# Patient Record
Sex: MALE | Race: WHITE | NOT HISPANIC OR LATINO | Employment: UNEMPLOYED | URBAN - NONMETROPOLITAN AREA
[De-identification: names, ages, dates, MRNs, and addresses within clinical notes are randomized per-mention and may not be internally consistent; named-entity substitution may affect disease eponyms.]

---

## 2018-11-01 ENCOUNTER — OFFICE VISIT (OUTPATIENT)
Dept: CARDIOLOGY | Facility: CLINIC | Age: 78
End: 2018-11-01

## 2018-11-01 VITALS
HEIGHT: 71 IN | HEART RATE: 65 BPM | DIASTOLIC BLOOD PRESSURE: 60 MMHG | SYSTOLIC BLOOD PRESSURE: 110 MMHG | BODY MASS INDEX: 30.52 KG/M2 | WEIGHT: 218 LBS

## 2018-11-01 DIAGNOSIS — Z72.0 TOBACCO ABUSE: ICD-10-CM

## 2018-11-01 DIAGNOSIS — I82.401 DEEP VEIN THROMBOSIS (DVT) OF RIGHT LOWER EXTREMITY, UNSPECIFIED CHRONICITY, UNSPECIFIED VEIN (HCC): ICD-10-CM

## 2018-11-01 DIAGNOSIS — E11.9 TYPE 2 DIABETES MELLITUS WITHOUT COMPLICATION, WITH LONG-TERM CURRENT USE OF INSULIN (HCC): ICD-10-CM

## 2018-11-01 DIAGNOSIS — E88.81 METABOLIC SYNDROME: ICD-10-CM

## 2018-11-01 DIAGNOSIS — I25.9 IHD (ISCHEMIC HEART DISEASE): Primary | ICD-10-CM

## 2018-11-01 DIAGNOSIS — E78.00 HYPERCHOLESTEREMIA: ICD-10-CM

## 2018-11-01 DIAGNOSIS — I73.9 PAD (PERIPHERAL ARTERY DISEASE) (HCC): ICD-10-CM

## 2018-11-01 DIAGNOSIS — I25.6 SILENT MYOCARDIAL ISCHEMIA: ICD-10-CM

## 2018-11-01 DIAGNOSIS — R01.1 MURMUR, CARDIAC: ICD-10-CM

## 2018-11-01 DIAGNOSIS — Z79.4 TYPE 2 DIABETES MELLITUS WITHOUT COMPLICATION, WITH LONG-TERM CURRENT USE OF INSULIN (HCC): ICD-10-CM

## 2018-11-01 DIAGNOSIS — I10 ESSENTIAL HYPERTENSION: ICD-10-CM

## 2018-11-01 DIAGNOSIS — R31.0 GROSS HEMATURIA: ICD-10-CM

## 2018-11-01 PROCEDURE — 93000 ELECTROCARDIOGRAM COMPLETE: CPT | Performed by: INTERNAL MEDICINE

## 2018-11-01 PROCEDURE — 99204 OFFICE O/P NEW MOD 45 MIN: CPT | Performed by: INTERNAL MEDICINE

## 2018-11-01 RX ORDER — CITALOPRAM 20 MG/1
20 TABLET ORAL DAILY
COMMUNITY

## 2018-11-01 RX ORDER — SIMVASTATIN 40 MG
40 TABLET ORAL NIGHTLY
COMMUNITY

## 2018-11-01 RX ORDER — NITROGLYCERIN 0.4 MG/1
0.4 TABLET SUBLINGUAL
COMMUNITY

## 2018-11-01 RX ORDER — GABAPENTIN 300 MG/1
300 CAPSULE ORAL 2 TIMES DAILY
COMMUNITY

## 2018-11-01 RX ORDER — CLOPIDOGREL BISULFATE 75 MG/1
75 TABLET ORAL DAILY
COMMUNITY

## 2018-11-01 RX ORDER — WARFARIN SODIUM 6 MG/1
6 TABLET ORAL
COMMUNITY

## 2018-11-01 RX ORDER — METOPROLOL TARTRATE 50 MG/1
TABLET, FILM COATED ORAL
COMMUNITY

## 2018-11-01 RX ORDER — HYDROCODONE BITARTRATE AND ACETAMINOPHEN 7.5; 325 MG/1; MG/1
1 TABLET ORAL 2 TIMES DAILY PRN
COMMUNITY

## 2018-11-01 RX ORDER — ASPIRIN 81 MG/1
81 TABLET ORAL DAILY
COMMUNITY

## 2018-11-01 RX ORDER — TRAMADOL HYDROCHLORIDE 50 MG/1
50 TABLET ORAL 2 TIMES DAILY PRN
COMMUNITY

## 2018-11-01 RX ORDER — BENAZEPRIL HYDROCHLORIDE 40 MG/1
40 TABLET, FILM COATED ORAL DAILY
COMMUNITY

## 2018-11-01 RX ORDER — ISOSORBIDE MONONITRATE 60 MG/1
60 TABLET, EXTENDED RELEASE ORAL DAILY
COMMUNITY

## 2018-11-01 NOTE — PROGRESS NOTES
I advised Alejandro of the risks of continuing to use tobacco, and I provided him with tobacco cessation educational materials in the After Visit Summary.     During this visit, I spent 1-2 minutes counseling the patient regarding tobacco cessation.

## 2018-11-01 NOTE — PATIENT INSTRUCTIONS
Mediterranean Diet  A Mediterranean diet refers to food and lifestyle choices that are based on the traditions of countries located on the [place]. This way of eating has been shown to help prevent certain conditions and improve outcomes for people who have chronic diseases, like kidney disease and heart disease.  What are tips for following this plan?  Lifestyle  · Cook and eat meals together with your family, when possible.  · Drink enough fluid to keep your urine clear or pale yellow.  · Be physically active every day. This includes:  ? Aerobic exercise like running or swimming.  ? Leisure activities like gardening, walking, or housework.  · Get 7-8 hours of sleep each night.  · If recommended by your health care provider, drink red wine in moderation. This means 1 glass a day for nonpregnant women and 2 glasses a day for men. A glass of wine equals 5 oz (150 mL).  Reading food labels  · Check the serving size of packaged foods. For foods such as rice and pasta, the serving size refers to the amount of cooked product, not dry.  · Check the total fat in packaged foods. Avoid foods that have saturated fat or trans fats.  · Check the ingredients list for added sugars, such as corn syrup.  Shopping  · At the grocery store, buy most of your food from the areas near the walls of the store. This includes:  ? Fresh fruits and vegetables (produce).  ? Grains, beans, nuts, and seeds. Some of these may be available in unpackaged forms or large amounts (in bulk).  ? Fresh seafood.  ? Poultry and eggs.  ? Low-fat dairy products.  · Buy whole ingredients instead of prepackaged foods.  · Buy fresh fruits and vegetables in-season from local farmers markets.  · Buy frozen fruits and vegetables in resealable bags.  · If you do not have access to quality fresh seafood, buy precooked frozen shrimp or canned fish, such as tuna, salmon, or sardines.  · Buy small amounts of raw or cooked vegetables, salads, or olives from the  deli or salad bar at your store.  · Stock your pantry so you always have certain foods on hand, such as olive oil, canned tuna, canned tomatoes, rice, pasta, and beans.  Cooking  · Cook foods with extra-virgin olive oil instead of using butter or other vegetable oils.  · Have meat as a side dish, and have vegetables or grains as your main dish. This means having meat in small portions or adding small amounts of meat to foods like pasta or stew.  · Use beans or vegetables instead of meat in common dishes like chili or lasagna.  · [place] with different cooking methods. Try roasting or broiling vegetables instead of steaming or sautéeing them.  · Add frozen vegetables to soups, stews, pasta, or rice.  · Add nuts or seeds for added healthy fat at each meal. You can add these to yogurt, salads, or vegetable dishes.  · Marinate fish or vegetables using olive oil, lemon juice, garlic, and fresh herbs.  Meal planning  · Plan to eat 1 vegetarian meal one day each week. Try to work up to 2 vegetarian meals, if possible.  · Eat seafood 2 or more times a week.  · Have healthy snacks readily available, such as:  ? Vegetable sticks with hummus.  ? Greek yogurt.  ? Fruit and nut trail mix.  · Eat balanced meals throughout the week. This includes:  ? Fruit: 2-3 servings a day  ? Vegetables: 4-5 servings a day  ? Low-fat dairy: 2 servings a day  ? Fish, poultry, or lean meat: 1 serving a day  ? Beans and legumes: 2 or more servings a week  ? Nuts and seeds: 1-2 servings a day  ? Whole grains: 6-8 servings a day  ? Extra-virgin olive oil: 3-4 servings a day  · Limit red meat and sweets to only a few servings a month  What are my food choices?  · Mediterranean diet  ? Recommended  ? Grains: Whole-grain pasta. Brown rice. Bulgar wheat. Polenta. Couscous. Whole-wheat bread. Oatmeal. Quinoa.  ? Vegetables: Artichokes. Beets. Broccoli. Cabbage. Carrots. Eggplant. Green beans. Chard. Kale. Spinach. Onions. Leeks. Peas. Squash.  Tomatoes. Peppers. Radishes.  ? Fruits: Apples. Apricots. Avocado. Berries. Bananas. Cherries. Dates. Figs. Grapes. Lwe. Melon. Oranges. Peaches. Plums. Pomegranate.  ? Meats and other protein foods: Beans. Almonds. Sunflower seeds. Pine nuts. Peanuts. Cod. [place]. Scallops. Shrimp. Tuna. Tilapia. Clams. Oysters. Eggs.  ? Dairy: Low-fat milk. Cheese. Greek yogurt.  ? Beverages: Water. Red wine. Herbal tea.  ? Fats and oils: Extra virgin olive oil. Avocado oil. Grape seed oil.  ? Sweets and desserts: Greek yogurt with honey. Baked apples. Poached pears. Trail mix.  ? Seasoning and other foods: Basil. Cilantro. Coriander. Cumin. Mint. Parsley. Vasu. Rosemary. Tarragon. Garlic. Oregano. Thyme. Pepper. Balsalmic vinegar. Tahini. Hummus. Tomato sauce. Olives. Mushrooms.  ? Limit these  ? Grains: Prepackaged pasta or rice dishes. Prepackaged cereal with added sugar.  ? Vegetables: Deep fried potatoes (french fries).  ? Fruits: Fruit canned in syrup.  ? Meats and other protein foods: Beef. Pork. Lamb. Poultry with skin. Hot dogs. [place].  ? Dairy: Ice cream. Sour cream. Whole milk.  ? Beverages: Juice. Sugar-sweetened soft drinks. Beer. Liquor and spirits.  ? Fats and oils: Butter. Canola oil. Vegetable oil. Beef fat (tallow). Lard.  ? Sweets and desserts: Cookies. Cakes. Pies. Candy.  ? Seasoning and other foods: Mayonnaise. Premade sauces and marinades.  ? The items listed may not be a complete list. Talk with your dietitian about what dietary choices are right for you.  Summary  · The Mediterranean diet includes both food and lifestyle choices.  · Eat a variety of fresh fruits and vegetables, beans, nuts, seeds, and whole grains.  · Limit the amount of red meat and sweets that you eat.  · Talk with your health care provider about whether it is safe for you to drink red wine in moderation. This means 1 glass a day for nonpregnant women and 2 glasses a day for men. A glass of wine equals 5 oz (150 mL).  This information  is not intended to replace advice given to you by your health care provider. Make sure you discuss any questions you have with your health care provider.  Document Released: 08/10/2017 Document Revised: 09/12/2017 Document Reviewed: 08/10/2017  [employer] Interactive Patient Education © 2018 [employer]

## 2018-11-01 NOTE — PROGRESS NOTES
Chief Complaint   Patient presents with   • Establish Care     Cardiac clearance, Dr ELOISE Teixeira, hematuria, cystoscopy with sedation   • Cardiac history     3 vessel CABG in 2003, multple stents since then, PVD, multiple stents in lower extremities bilaterally, CABG and all stents were placed in [place], [place]. Last seen there with blood clot in right lower extremity about 5-6 years ago. Moved here 4-5 years ago, Dr Robertson did workup in 2015, we have his office note, requesting testing.   • Shortness of Breath     chronic, with mild exertion, no worse than before.    • Medication Problem     Pt has had hematuria for 28 days, Dr Teixeira stopped ASA and Plavix approximately 2 weeks ago.         CARDIAC COMPLAINTS  dyspnea, fatigue and Pre op      Subjective   Alejandro Penny is a 78 y.o. male came in today for his initial cardiac evaluation.  He has history of ischemic heart disease diagnosed in 2003 when he underwent three-vessel bypass surgery.  Since then he has undergone multiple cardiac catheterization and stenting.  He also has history of peripheral vascular disease, for which he has undergone surgery and stenting.  He was seen by another cardiologist about 3 years ago and underwent few investigation including stress test and echocardiogram which was reported as normal.  He never had any chest pain during this time.  He is not sure exactly the reason why he underwent bypass surgery.  He always has been having trouble breathing.  He is now followed with any cardiologist at this time.  In July of this year, he had gross hematuria and it was painful when he passed the clot.  It lasted for few days and subsided.  It now restarted again.  He is not due to undergo cystoscopy precipitation and needed clearance.  He also has history of prostate cancer for which he has undergone radiation.  He also has history of clot formation and has been on Coumadin for many years.  For some reason, he is also on Plavix and aspirin.  He  still denies any chest pain.  He still has shortness of breath on exertion.  He still continues to have claudication pain.  He unfortunately still continues to smoke and has no plans to quit smoking.  He also chews tobacco.  His mother passed away with heart disease.    Past Surgical History:   Procedure Laterality Date   • CARDIOVASCULAR STRESS TEST  01/05/2015    @[facility]. Dr.[email or web address]- [place] Cardiolite- Negative.   • CORONARY ARTERY BYPASS GRAFT  2003    3 Vessel.   • ECHO - CONVERTED  01/05/2015    @[facility]. - EF 60%. RVSP- 41 mmHg.   • OTHER SURGICAL HISTORY  01/05/2015    Arterial Doppler- Patent Bilateral Fempop graft.       Current Outpatient Prescriptions   Medication Sig Dispense Refill   • aspirin 81 MG EC tablet Take 81 mg by mouth Daily.     • benazepril (LOTENSIN) 40 MG tablet Take 40 mg by mouth Daily.     • citalopram (CeleXA) 20 MG tablet Take 20 mg by mouth Daily.     • clopidogrel (PLAVIX) 75 MG tablet Take 75 mg by mouth Daily.     • gabapentin (NEURONTIN) 300 MG capsule Take 300 mg by mouth 2 (Two) Times a Day.     • HYDROcodone-acetaminophen (NORCO) 7.5-325 MG per tablet Take 1 tablet by mouth 2 (Two) Times a Day As Needed for Moderate Pain .     • Insulin Glargine (TOUJEO SOLOSTAR SC) Inject  under the skin into the appropriate area as directed.     • isosorbide mononitrate (IMDUR) 60 MG 24 hr tablet Take 60 mg by mouth Daily.     • metFORMIN (GLUCOPHAGE) 500 MG tablet Take 500 mg by mouth 2 (Two) Times a Day With Meals.     • metoprolol tartrate (LOPRESSOR) 50 MG tablet 1/2 tab every other day     • nitroglycerin (NITROSTAT) 0.4 MG SL tablet Place 0.4 mg under the tongue Every 5 (Five) Minutes As Needed for Chest Pain. Take no more than 3 doses in 15 minutes.     • simvastatin (ZOCOR) 40 MG tablet Take 40 mg by mouth Every Night.     • traMADol (ULTRAM) 50 MG tablet Take 50 mg by mouth 2 (Two) Times a Day As Needed for Moderate Pain .     • warfarin (COUMADIN) 6 MG tablet Take 6 mg by  mouth Daily. PCP monitors       No current facility-administered medications for this visit.            ALLERGIES:  Penicillins    Past Medical History:   Diagnosis Date   • Cancer (CMS/HCC)     prostate   • COPD (chronic obstructive pulmonary disease) (CMS/HCC)    • Coronary artery disease    • Deep vein thrombosis (CMS/HCC)     right leg, 2013   • Diabetes mellitus (CMS/HCC)    • History of cholecystectomy    • Hyperlipidemia    • Hypertension    • Peripheral vascular disease (CMS/HCC)    • S/P TURP (transurethral resection of prostate)    • Skin cancer        History   Smoking Status   • Current Every Day Smoker   • Packs/day: 1.00   • Years: 70.00   • Types: Cigarettes   Smokeless Tobacco   • Current User   • Types: Chew          Family History   Problem Relation Age of Onset   • Heart disease Mother    • Hypertension Mother        Review of Systems   Constitution: Positive for weakness and malaise/fatigue. Negative for decreased appetite.   HENT: Negative for congestion and sore throat.    Eyes: Negative for blurred vision.   Cardiovascular: Positive for dyspnea on exertion. Negative for chest pain and palpitations.   Respiratory: Positive for shortness of breath. Negative for snoring.    Endocrine: Negative for cold intolerance and heat intolerance.   Hematologic/Lymphatic: Negative for adenopathy. Does not bruise/bleed easily.   Skin: Negative for itching, nail changes and skin cancer.   Musculoskeletal: Positive for arthritis, muscle cramps and myalgias.   Gastrointestinal: Negative for abdominal pain, dysphagia and heartburn.   Genitourinary: Positive for hematuria. Negative for bladder incontinence and frequency.   Neurological: Negative for dizziness, light-headedness, seizures and vertigo.   Psychiatric/Behavioral: Negative for altered mental status.   Allergic/Immunologic: Negative for environmental allergies and hives.       Diabetes- Yes  Thyroid- normal    Objective     /60 (BP Location: Right  "arm)   Pulse 65   Ht 179.1 cm (70.5\")   Wt 98.9 kg (218 lb)   BMI 30.84 kg/m²     Physical Exam   Constitutional: He is oriented to person, place, and time. He appears well-developed and well-nourished.   HENT:   Head: Normocephalic.   Nose: Nose normal.   Eyes: Pupils are equal, round, and reactive to light. EOM are normal.   Neck: Normal range of motion. Neck supple.   Cardiovascular: Normal rate, regular rhythm, S1 normal and S2 normal.    Murmur heard.  Pulses:       Femoral pulses are 1+ on the right side, and 1+ on the left side.       Dorsalis pedis pulses are 1+ on the right side, and 1+ on the left side.        Posterior tibial pulses are 1+ on the right side, and 1+ on the left side.   Pulmonary/Chest: Effort normal and breath sounds normal.   Abdominal: Soft. Bowel sounds are normal.   Musculoskeletal: Normal range of motion. He exhibits no edema.   Neurological: He is alert and oriented to person, place, and time.   Skin: Skin is warm and dry.   Psychiatric: He has a normal mood and affect.         ECG 12 Lead  Date/Time: 11/1/2018 1:03 PM  Performed by: ISAIAS LORENZO  Authorized by: ISAIAS LORENZO   Previous ECG: no previous ECG available  Rhythm: sinus rhythm  Ectopy: atrial premature contractions  Rate: normal  Conduction: right bundle branch block  QRS axis: normal  Clinical impression: abnormal ECG              Assessment/Plan   Patient's Body mass index is 30.84 kg/m². BMI is above normal parameters. Recommendations include: educational material, exercise counseling and nutrition counseling.     Alejandro was seen today for establish care, cardiac history, shortness of breath and medication problem.    Diagnoses and all orders for this visit:    IHD (ischemic heart disease)  -     Stress Test With Myocardial Perfusion One Day; Future  -     Adult Transthoracic Echo Complete W/ Cont if Necessary Per Protocol; Future    Essential hypertension    Hypercholesteremia    Type 2 diabetes " mellitus without complication, with long-term current use of insulin (CMS/HCC)    Metabolic syndrome  -     Adult Transthoracic Echo Complete W/ Cont if Necessary Per Protocol; Future    PAD (peripheral artery disease) (CMS/HCC)    Gross hematuria    Tobacco abuse    Silent myocardial ischemia  -     Stress Test With Myocardial Perfusion One Day; Future    Murmur, cardiac  -     Adult Transthoracic Echo Complete W/ Cont if Necessary Per Protocol; Future    Deep vein thrombosis (DVT) of right lower extremity, unspecified chronicity, unspecified vein (CMS/HCC)       At baseline his heart rate and blood pressure appears stable.  His EKG showed sinus rhythm with PAC's and right bundle branch block.  I don't have any old EKG to compare.  His clinical examination reveals short systolic murmur at the mitral area, slightly loud second heart sound.  He does have diminished peripheral pulse.  I talked to him about his smoking, and he stated that he is not interested in quitting.  I explained to him about silent ischemia.  I scheduled him to undergo an echocardiogram to reevaluate the LV function and the PA pressure.  I also scheduled him to undergo a stress test in the form of Lexiscan to rule out any silent ischemia.  If the left ventricle of function is still within normal limit and if the septum appears to be well perfused, then his risk of developing complications during surgery will be low.  At this time a don't think he needs to be on Plavix.  He can continue his aspirin and Coumadin.  Based on the results, further recommendations will be made.  I also talked to him about his BMI which is 31.  I gave him papers on Mediterranean diet.             Electronically signed by Nba Grace MD November 1, 2018 12:59 PM

## 2018-11-07 ENCOUNTER — HOSPITAL ENCOUNTER (OUTPATIENT)
Dept: CARDIOLOGY | Facility: HOSPITAL | Age: 78
Discharge: HOME | End: 2018-11-07
Attending: INTERNAL MEDICINE

## 2018-11-07 DIAGNOSIS — I25.6 SILENT MYOCARDIAL ISCHEMIA: ICD-10-CM

## 2018-11-07 DIAGNOSIS — I25.9 IHD (ISCHEMIC HEART DISEASE): ICD-10-CM

## 2018-11-07 DIAGNOSIS — E88.81 METABOLIC SYNDROME: ICD-10-CM

## 2018-11-07 DIAGNOSIS — R01.1 MURMUR, CARDIAC: ICD-10-CM

## 2018-11-07 LAB
BH CV ECHO MEAS - ACS: 2.4 CM
BH CV ECHO MEAS - AO MEAN PG: 4.9 MMHG
BH CV ECHO MEAS - AO ROOT AREA (BSA CORRECTED): 1.6
BH CV ECHO MEAS - AO ROOT AREA: 9 CM^2
BH CV ECHO MEAS - AO ROOT DIAM: 3.4 CM
BH CV ECHO MEAS - AO V2 MEAN: 103.7 CM/SEC
BH CV ECHO MEAS - AO V2 VTI: 34.5 CM
BH CV ECHO MEAS - BSA(HAYCOCK): 2.2 M^2
BH CV ECHO MEAS - BSA: 2.2 M^2
BH CV ECHO MEAS - BZI_BMI: 31.3 KILOGRAMS/M^2
BH CV ECHO MEAS - BZI_METRIC_HEIGHT: 177.8 CM
BH CV ECHO MEAS - BZI_METRIC_WEIGHT: 98.9 KG
BH CV ECHO MEAS - EDV(CUBED): 19.8 ML
BH CV ECHO MEAS - EDV(MOD-SP4): 80 ML
BH CV ECHO MEAS - EDV(TEICH): 27.2 ML
BH CV ECHO MEAS - EF(CUBED): 66 %
BH CV ECHO MEAS - EF(MOD-SP4): 53.8 %
BH CV ECHO MEAS - EF(TEICH): 59.6 %
BH CV ECHO MEAS - ESV(CUBED): 6.7 ML
BH CV ECHO MEAS - ESV(MOD-SP4): 37 ML
BH CV ECHO MEAS - ESV(TEICH): 11 ML
BH CV ECHO MEAS - FS: 30.2 %
BH CV ECHO MEAS - IVS/LVPW: 1.1
BH CV ECHO MEAS - IVSD: 1.4 CM
BH CV ECHO MEAS - LA DIMENSION: 3.6 CM
BH CV ECHO MEAS - LA/AO: 1.1
BH CV ECHO MEAS - LV DIASTOLIC VOL/BSA (35-75): 36.9 ML/M^2
BH CV ECHO MEAS - LV IVRT: 0.11 SEC
BH CV ECHO MEAS - LV MASS(C)D: 115.8 GRAMS
BH CV ECHO MEAS - LV MASS(C)DI: 53.5 GRAMS/M^2
BH CV ECHO MEAS - LV SYSTOLIC VOL/BSA (12-30): 17.1 ML/M^2
BH CV ECHO MEAS - LVIDD: 2.7 CM
BH CV ECHO MEAS - LVIDS: 1.9 CM
BH CV ECHO MEAS - LVLD AP4: 7.7 CM
BH CV ECHO MEAS - LVLS AP4: 6.9 CM
BH CV ECHO MEAS - LVOT AREA (M): 4.9 CM^2
BH CV ECHO MEAS - LVOT AREA: 4.9 CM^2
BH CV ECHO MEAS - LVOT DIAM: 2.5 CM
BH CV ECHO MEAS - LVPWD: 1.3 CM
BH CV ECHO MEAS - MV A MAX VEL: 108.1 CM/SEC
BH CV ECHO MEAS - MV DEC SLOPE: 195.8 CM/SEC^2
BH CV ECHO MEAS - MV E MAX VEL: 77.5 CM/SEC
BH CV ECHO MEAS - MV E/A: 0.72
BH CV ECHO MEAS - RAP SYSTOLE: 10 MMHG
BH CV ECHO MEAS - RVDD: 2.8 CM
BH CV ECHO MEAS - RVSP: 33.9 MMHG
BH CV ECHO MEAS - SI(AO): 143.2 ML/M^2
BH CV ECHO MEAS - SI(CUBED): 6 ML/M^2
BH CV ECHO MEAS - SI(MOD-SP4): 19.9 ML/M^2
BH CV ECHO MEAS - SI(TEICH): 7.5 ML/M^2
BH CV ECHO MEAS - SV(AO): 310.1 ML
BH CV ECHO MEAS - SV(CUBED): 13.1 ML
BH CV ECHO MEAS - SV(MOD-SP4): 43 ML
BH CV ECHO MEAS - SV(TEICH): 16.2 ML
BH CV ECHO MEAS - TR MAX VEL: 244.3 CM/SEC
BH CV NUCLEAR PRIOR STUDY: 3
BH CV STRESS COMMENTS STAGE 1: NORMAL
BH CV STRESS DOSE REGADENOSON STAGE 1: 0.4
BH CV STRESS DURATION MIN STAGE 1: 0
BH CV STRESS DURATION SEC STAGE 1: 10
BH CV STRESS PROTOCOL 1: NORMAL
BH CV STRESS RECOVERY BP: NORMAL MMHG
BH CV STRESS RECOVERY HR: 78 BPM
BH CV STRESS STAGE 1: 1
LV EF NUC BP: 70 %
MAXIMAL PREDICTED HEART RATE: 140 BPM
MAXIMAL PREDICTED HEART RATE: 140 BPM
PERCENT MAX PREDICTED HR: 64.29 %
STRESS BASELINE BP: NORMAL MMHG
STRESS BASELINE HR: 64 BPM
STRESS PERCENT HR: 76 %
STRESS POST PEAK BP: NORMAL MMHG
STRESS POST PEAK HR: 90 BPM
STRESS TARGET HR: 119 BPM
STRESS TARGET HR: 119 BPM

## 2018-11-07 PROCEDURE — 93306 TTE W/DOPPLER COMPLETE: CPT | Performed by: INTERNAL MEDICINE

## 2018-11-07 PROCEDURE — 0 TECHNETIUM SESTAMIBI: Performed by: INTERNAL MEDICINE

## 2018-11-07 PROCEDURE — 93017 CV STRESS TEST TRACING ONLY: CPT

## 2018-11-07 PROCEDURE — 25010000002 REGADENOSON 0.4 MG/5ML SOLUTION: Performed by: INTERNAL MEDICINE

## 2018-11-07 PROCEDURE — 93018 CV STRESS TEST I&R ONLY: CPT | Performed by: INTERNAL MEDICINE

## 2018-11-07 PROCEDURE — 78452 HT MUSCLE IMAGE SPECT MULT: CPT | Performed by: INTERNAL MEDICINE

## 2018-11-07 PROCEDURE — 78452 HT MUSCLE IMAGE SPECT MULT: CPT

## 2018-11-07 PROCEDURE — A9500 TC99M SESTAMIBI: HCPCS | Performed by: INTERNAL MEDICINE

## 2018-11-07 PROCEDURE — 93306 TTE W/DOPPLER COMPLETE: CPT

## 2018-11-07 RX ADMIN — REGADENOSON 0.4 MG: 0.08 INJECTION, SOLUTION INTRAVENOUS at 13:42

## 2018-11-07 RX ADMIN — TECHNETIUM TC 99M SESTAMIBI 1 DOSE: 1 INJECTION INTRAVENOUS at 13:43

## 2018-11-07 RX ADMIN — TECHNETIUM TC 99M SESTAMIBI 1 DOSE: 1 INJECTION INTRAVENOUS at 13:42

## 2018-11-08 NOTE — PROGRESS NOTES
Pt's wife aware of results and recommendations to continue current medications and that he is cleared for surgery with usual precaution. Will fax copy to Dr Shad Teixeira per pt request.